# Patient Record
Sex: FEMALE | Employment: STUDENT | URBAN - METROPOLITAN AREA
[De-identification: names, ages, dates, MRNs, and addresses within clinical notes are randomized per-mention and may not be internally consistent; named-entity substitution may affect disease eponyms.]

---
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
## 2025-07-09 ENCOUNTER — OFFICE VISIT (OUTPATIENT)
Dept: OBGYN CLINIC | Facility: CLINIC | Age: 20
End: 2025-07-09
Payer: COMMERCIAL

## 2025-07-09 VITALS
DIASTOLIC BLOOD PRESSURE: 66 MMHG | WEIGHT: 169.38 LBS | SYSTOLIC BLOOD PRESSURE: 108 MMHG | HEART RATE: 67 BPM | BODY MASS INDEX: 33.25 KG/M2 | HEIGHT: 60 IN

## 2025-07-09 DIAGNOSIS — N93.9 ABNORMAL UTERINE BLEEDING (AUB): ICD-10-CM

## 2025-07-09 DIAGNOSIS — Z13.220 LIPID SCREENING: ICD-10-CM

## 2025-07-09 DIAGNOSIS — Z01.419 ENCOUNTER FOR GYNECOLOGICAL EXAMINATION WITHOUT ABNORMAL FINDING: Primary | ICD-10-CM

## 2025-07-09 PROCEDURE — 3074F SYST BP LT 130 MM HG: CPT | Performed by: OBSTETRICS & GYNECOLOGY

## 2025-07-09 PROCEDURE — 3078F DIAST BP <80 MM HG: CPT | Performed by: OBSTETRICS & GYNECOLOGY

## 2025-07-09 PROCEDURE — 3008F BODY MASS INDEX DOCD: CPT | Performed by: OBSTETRICS & GYNECOLOGY

## 2025-07-09 PROCEDURE — 99385 PREV VISIT NEW AGE 18-39: CPT | Performed by: OBSTETRICS & GYNECOLOGY

## 2025-07-09 RX ORDER — AMPHETAMINE 5 MG/1
1 TABLET, EXTENDED RELEASE ORAL EVERY MORNING
COMMUNITY
Start: 2025-02-10

## 2025-07-09 RX ORDER — DEXTROAMPHETAMINE SACCHARATE, AMPHETAMINE ASPARTATE MONOHYDRATE, DEXTROAMPHETAMINE SULFATE AND AMPHETAMINE SULFATE 2.5; 2.5; 2.5; 2.5 MG/1; MG/1; MG/1; MG/1
10 CAPSULE, EXTENDED RELEASE ORAL DAILY
COMMUNITY
Start: 2025-04-17

## 2025-07-09 RX ORDER — DROSPIRENONE AND ETHINYL ESTRADIOL 0.02-3(28)
1 KIT ORAL DAILY
Qty: 84 TABLET | Refills: 1 | Status: SHIPPED | OUTPATIENT
Start: 2025-07-09

## 2025-07-09 NOTE — PATIENT INSTRUCTIONS
Instructions for Birth Control Pill Use    The birth control pill works primarily by blocking ovulation (release of an egg).  If there is no egg to meet the sperm, pregnancy cannot occur.  The pill also works by making cervical mucous thick and unreceptive to sperm, slowing tubal function which has to move the egg down the tube to meet the sperm.    For women who follow these directions carefully, the pill is an effective reversible contraceptive currently available.    Starting birth control pills for the first time  1). Choose a backup method of birth control (such as condoms, diaphragm or foam) to use with your first pack of pills because the pill may not fully protect you from pregnancy during the first two weeks.  Keep this backup method handy and use it in case you:  Run out of pills  Forget to take your pill  Discontinue pill use  The use of condoms is ALWAYS encouraged to protect against sexually transmitted diseases, if applicable.   2). There are several ways to start taking your pills. Use one of the following approaches:  First day of period start: Start your first pack of pills on the day of your period. No back-up method is required  Sunday start: Start your first pack of pills on the first Sunday after your period begins.  This will result in your menses almost always beginning on a Tuesday or a Wednesday every 4 weeks.  You will need a backup method for 14 days.  Quick Start: Start immediately if pregnancy is excluded. You will need a back-up method for 14 days.  Quick start will cause your period to be irregular.  3). Take one pill a day until you finish the pack.   If you are using a 28-day pack, begin a new pack immediately. Skip no days between packages  If you are using a 21-day pack, stop taking pills for 1 week and then start your new pack   4). Try to associate taking your pill with something you do around the same time every day, like brushing your teeth in the morning, eating a meal or  going to bed.  Establishing a routine will make it easier for you to remember. The pill works best if you take it about the same time every day.    Continuing on the pills ~ What if……  1). If you have bleeding between periods  This is a very common side effect of birth control pills for the first 3 months.  Spotting (light bleeding between periods) can also occur if you miss a pill.  Call the doctor’s office for advice if bleeding is heavier than 1 pad an hour or the bleeding between periods last for more than 3 months  2). If you have nausea or vomiting  Nausea and vomiting may occur during the first few months of taking birth control pills.  Sometimes by changing the time of the day when you take the pill will help.  Try switching to dinner time or before bed.  If you had episodes of vomiting within 2 hours of taking your pill, please use a back-up plan for the rest of the cycle because your body may not absorbed the pill.  Contact your doctor’s office if you have persistent nausea and vomiting.  3). If you miss your period  It is not uncommon for your periods to become lighter while taking birth control pills or miss you period all together.  If you missed any pills in the pack prior to this occurring, you should take a home pregnancy test prior to starting a new pack.  4). If you forget your pills for a day or two follow the instructions below:  If you miss a pill, take the forgotten one (yesterday’s pill) as soon as you remember it and take today’s pill at the regular time.  Although you probably will not become pregnant, use a back-up method until your next period.  If you miss two pills in a row, take two pills as soon as you remember and two pills the next day.  You may have some spotting and nausea.  Please use a back-up method until your next period.  If you miss three or more pills in a row, do not take all 3 pills at once.  If you are in the third week of pills, finish the pack and skip the inactive  pills and start a new pack.  Start your backup method of birth control immediately.  You are at risk for becoming pregnant if you do not use a backup contraception.    Remember, missed pills may cause you to start spotting or bleeding for about 7 days.    5). If you experience breast soreness, mild headaches, mild edema (swelling)  These symptoms are usually mild and will improve after being on the pill for 3 or more months.  If any of these symptoms are severe or persist more than 3 months, you should contact our office.  6). If you experience mood swings or changes  Usually, after you adjust to the hormones, these symptoms will improve.  If at any time these symptoms are severe or persistent, you should contact our office.    7). If your doctor has you on continuous pills (No 7 day break after 21 days of active pills) in order to suppress your menses because of endometriosis or premenstrual syndrome, you will likely have break through bleeding.  If the spotting persists through more than 3 packs of pills, contact your doctor to confirm that you should stay on that brand of pills    8). If you need to take any other medications, including antibiotics, check with the pharmacist to see if there will be any interaction or if it will make your birth control pill less effective.      When to contact your provider  If you are having severe abdominal pain  Chest pain and shortness of breath  Severe Headaches  Blurred Vision or Vision Loss  Severe leg pain- calf or thigh    If you have additional questions or concerns, please call us at [phone]

## 2025-07-09 NOTE — PROGRESS NOTES
[facility]  Obstetrics and Gynecology  History & Physical    CC: Patient is a new patient and here for a well woman exam     Subjective:     HPI: Kathi Jimenes is a 20 year old  female here for a well women exam.  Was on COCs more than 5 years ago for irregular periods.     OB History:  OB History    Para Term  AB Living   0 0 0 0 0 0   SAB IAB Ectopic Multiple Live Births   0 0 0 0 0       Gyn History:  Menarche: [age]  Period Cycle (Days): irregular  Period Duration (Days): varies 3-14 days  Period Flow: light- heavy  Use of Birth Control (if yes, specify type): Abstinence  Pap Result Notes: Pt has never had a pap  Patient's last menstrual period was 2025 (approximate).    OB/GYN Hx:  - LMP: Patient's last menstrual period was 2025 (approximate). Cycles are irregular - can go two months without one. Flow, days all vary. Sometimes they are heavy; sometimes light. On heaviest day; going through 5-6 maxi pads.   - Has had cystic acne her whole life - cleansers have not worked  - Never been sexually active.   - No history of STD/STIs (non-HPV).   - Patient denies fecal/urinary incontinence. Patient denies any bulging sensation on genital area. Denies menopausal symptoms.    - Feels safe at home    Meds:  Medications Ordered Prior to Encounter[1]  Dynavel; Adderral     All:  Allergies[2]  None    PMH:  Past Medical History[3]  None    Immunization History:   Immunization History   Administered Date(s) Administered    Covid-19 Vaccine Pfizer 30 mcg/0.3 ml 06/10/2021       PSH:  Past Surgical History[4]  [place] teeth surgery     Social History:  Social History     Socioeconomic History    Marital status: Single     Spouse name: Not on file    Number of children: Not on file    Years of education: Not on file    Highest education level: Not on file   Occupational History    Not on file   Tobacco Use    Smoking status: Never    Smokeless tobacco: Never   Vaping Use    Vaping  status: Never Used   Substance and Sexual Activity    Alcohol use: Never    Drug use: Never    Sexual activity: Never   Other Topics Concern    Not on file   Social History Narrative    Not on file     Social Drivers of Health     Food Insecurity: Not on file   Transportation Needs: Not on file   Stress: Not on file   Housing Stability: Not on file       Family History:  Family History[5]    - Unknown family history due to being adopted     Review of Systems:  General: no complaints per category. See HPI for additional information.   Breast: no complaints per category. See HPI for additional information.   Respiratory: no complaints per category. See HPI for additional information.   Cardiovascular: no complaints per category. See HPI for additional information.   GI: no complaints per category. See HPI for additional information.   : no complaints per category. See HPI for additional information.   Heme: no complaints per category. See HPI for additional information.       Objective:     Vitals:    25 1020   BP: 108/66   Pulse: 67   Weight: 169 lb 6 oz (76.8 kg)   Height: 60\"       Body mass index is 33.08 kg/m².    Physical Exam  General: AAO.NAD.   Face: cystic acne noted  CVS exam: normal peripheral perfusion  Chest: non-labored breathing, no tachypnea   Breast: deferreddue to age  Abdominal exam: nondistended  Pelvic exam: deferred due to age  Ext: non-tender, no edema        Depression Screening (PHQ-2/PHQ-9): Over the LAST 2 WEEKS                        Assessment:     Kathi Jimenes is a 20 year old  female here for a well women exam.     Plan:     Problem List Items Addressed This Visit    None      Patient Active Problem List    Diagnosis    MDD (major depressive disorder), single episode, severe (HCC)    Generalized anxiety disorder with panic attacks    Social anxiety disorder       Cervical cancer screening  - not due until next year     AUB  Concern for PCOS (polycystic ovarian  syndrome)  - Irregular periods; has had cystic acne; however no dx currently of PCOS - BMI 33  - Will get labs: TSH, LH, FSH, testosterone, estradiol, prolactin  - ultrasound ordered; discussed difficulty of diagnosing PCOS  - d/w patient management recommendations for PCOS including but not limited to weight management, menstrual regulation and routine health screening; discussed exercise regimen and diet (nutrition referral)   - d/w patient recommendation for medical management for menstrual regulation and endometrial protection  - d/w patient risks, benefits and alternatives for medical management for endometrial protection including OCP, Nuvaring, Depo Provera and LARC (Nexplanon versus IUD)    - pt would like to start COCs - sent prescription for Rosalie to help with hormonal acne   - d/w patient possible impact of fertility w/ PCOS diagnosis  - however, pt informed that true fertility impact is unknown at this time until patient is ready to try for pregnancy and will need referral to [facility]    GYN Health Maintenance  - Periods irregular see above  - Not sexually active  - Immunizations; HPV has received in 2017  - Body mass index is 33.08 kg/m².; encouraged to maintain weight at healthy BMI  - discussed importance of exercise and healthy eating  - Neg domestic abuse screening    All of the findings and plan were discussed with the patient.  She notes understanding and agrees with the plan of care.  All questions were answered to the best of my ability at this time.    RTC in 3 months     Melany Vyas MD   [facility] - OBGYN      Discussed with patient that there will not be further notification of normal or benign results other than receiving results on ReferMe. A ReferMe message or telephone call will be placed by the physician and/or office staff if results are abnormal.     Note to patient and family   The 21st Century Cures Act makes medical notes available to patients in the interest of transparency.  However, please  be advised that this is a medical document.  It is intended as [id number]-hb-joem communication.  It is written and medical language may contain abbreviations or verbiage that are technical and unfamiliar.  It may appear blunt or direct.  Medical documents are intended to carry relevant information, facts as evident, and the clinical opinion of the practitioner.      This note could include assistance by Dragon voice recognition. Errors in content may be related to improper recognition by the system; efforts to review and correct have been done but errors may still exist.          [1]   Current Outpatient Medications on File Prior to Visit   Medication Sig Dispense Refill    DYANAVEL XR 5 MG Oral Tab CR Take 1 tablet by mouth every morning.      amphetamine-dextroamphetamine ER 10 MG Oral Capsule SR 24 Hr Take 1 capsule (10 mg total) by mouth daily.      Melatonin ER 1 MG Oral Tab CR Take 2 mg by mouth nightly as needed (Monday-Thursday only per pt's mother.).      buPROPion HCl 75 MG Oral Tab Take 150 mg by mouth every morning. (Patient not taking: Reported on 7/9/2025)      VYVANSE 20 MG Oral Cap Take 20 mg by mouth every morning.   (Patient not taking: Reported on 7/9/2025)      Sertraline HCl 100 MG Oral Tab Take 150 mg by mouth daily. (Patient not taking: Reported on 7/9/2025)       No current facility-administered medications on file prior to visit.   [2] No Known Allergies  [3]   Past Medical History:   ADHD    Anxiety    Depression   [4]   Past Surgical History:  Procedure Laterality Date    [place] teeth removed     [5]   Family History  Problem Relation Age of Onset    No Known Problems Father     No Known Problems Mother